# Patient Record
Sex: MALE | Race: OTHER | Employment: FULL TIME | URBAN - METROPOLITAN AREA
[De-identification: names, ages, dates, MRNs, and addresses within clinical notes are randomized per-mention and may not be internally consistent; named-entity substitution may affect disease eponyms.]

---

## 2022-01-19 ENCOUNTER — HOSPITAL ENCOUNTER (OUTPATIENT)
Dept: GENERAL RADIOLOGY | Facility: HOSPITAL | Age: 52
Discharge: HOME | End: 2022-01-19
Attending: HOSPITALIST
Payer: COMMERCIAL

## 2022-01-19 DIAGNOSIS — R06.00 DYSPNEA ON EXERTION: ICD-10-CM

## 2022-01-19 PROCEDURE — 71046 X-RAY EXAM CHEST 2 VIEWS: CPT | Performed by: HOSPITALIST

## 2022-02-02 ENCOUNTER — OFFICE VISIT (OUTPATIENT)
Dept: SLEEP CENTER | Age: 52
End: 2022-02-02
Attending: Other
Payer: COMMERCIAL

## 2022-02-02 DIAGNOSIS — G47.33 OSA (OBSTRUCTIVE SLEEP APNEA): ICD-10-CM

## 2022-02-02 PROCEDURE — 95806 SLEEP STUDY UNATT&RESP EFFT: CPT

## 2022-02-07 NOTE — PROCEDURES
[address]       Accredited by the [facility] ([facility])    PATIENT'S NAME:        Noble Flores PHYSICIAN:   Valerie Reed DO  REFERRING PHYSICIAN:   Sarah Lamar MD  PATIENT ACCOUNT #:     [de-identified]        LOCATION:       [address] [place] [address] #:      [id number]        YOB: 1970  DATE OF STUDY:         02/02/2022    SLEEP STUDY REPORT    STUDY TYPE:  Unattended sleep study. CLINICAL HISTORY:  This is a 53-year-old male whose height is 5 feet 7 inches, weight is 165 pounds, body mass index is 26. Patient is undergoing evaluation for sleep-disordered breathing. He experiences episodes of choking at night. He awakens with his heart racing and is reporting frequent snoring. This is a report of his home sleep test.     UNATTENDED SLEEP STUDY RECORDING PARAMETERS:  The patient underwent a formal technically adequate unattended diagnostic sleep study [facility] The study was performed in accordance with the AASM standard for Out of Center Sleep Testing. The four-channel Type III HST measures the following parameters:  flow, respiratory effort, pulse, and oxygen saturation. SCORING:  This study was scored in accordance with AASM scoring rules and Medicare rule 1B. FINDINGS:  The flow evaluation recording time began at 12:24 a.m. and ended at 8:26 a.m. for a duration of 7 hours and 49 minutes. Snoring was recorded. There were a total of 27 hypopneas and 34 apneas. Overall apnea-hypopnea index was 7.8. Supine index was 10.7, the lateral index was 0.9. The lowest oxyhemoglobin desaturation was seen at a sandrine of 90%. Total time spent with oxyhemoglobin saturations less than 88% was 0 minutes, and average pulse was 63. IMPRESSION:  Overall apnea-hypopnea index is consistent with mild obstructive sleep apnea. The overall AHI is 7.8.   The lowest oxyhemoglobin desaturation is seen at a sandrine of 90%. DIAGNOSIS:  Obstructive sleep apnea. DIAGNOSTIC PLAN:    1. At the request of the referring physician, we will confer with the patient regarding the results of the study and make treatment recommendations. 2.   Patient is a candidate for nasal CPAP, oral appliance therapy, and evaluation of the upper airway by ear, nose, throat specialist.   3.   Weight loss would likely have an ameliorating effect on the degree of sleep-disordered breathing identified, and weight loss is advised as appropriate. 4.   If daytime sleepiness is a complaint, the patient needs to understand potential dangers associated with reduced daytime vigilance. 5.   Care should be used with the administration of anesthetic and sedative agents, and alcohol should be avoided. Thank you for your confidence in the [facility]. Please do not hesitate to contact us for any questions at [phone].     Dictated By Sammy Pratt DO  d: 02/07/2022 13:57:38  t: 02/07/2022 14:56:20  Job [id number]  RB/

## 2022-03-23 ENCOUNTER — ORDER TRANSCRIPTION (OUTPATIENT)
Dept: ADMINISTRATIVE | Facility: HOSPITAL | Age: 52
End: 2022-03-23

## 2022-03-26 ENCOUNTER — LAB ENCOUNTER (OUTPATIENT)
Dept: LAB | Facility: HOSPITAL | Age: 52
End: 2022-03-26
Attending: HOSPITALIST
Payer: COMMERCIAL

## 2022-03-26 DIAGNOSIS — Z01.818 PREOP EXAMINATION: ICD-10-CM

## 2022-03-26 LAB — SARS-COV-2 RNA RESP QL NAA+PROBE: NOT DETECTED

## 2022-03-29 ENCOUNTER — HOSPITAL ENCOUNTER (OUTPATIENT)
Dept: RESPIRATORY THERAPY | Facility: HOSPITAL | Age: 52
Discharge: HOME | End: 2022-03-29
Attending: HOSPITALIST
Payer: COMMERCIAL

## 2022-03-29 DIAGNOSIS — R06.00 DYSPNEA ON EXERTION: ICD-10-CM

## 2022-03-29 PROCEDURE — 94726 PLETHYSMOGRAPHY LUNG VOLUMES: CPT

## 2022-03-29 PROCEDURE — 94729 DIFFUSING CAPACITY: CPT

## 2022-03-29 PROCEDURE — 94010 BREATHING CAPACITY TEST: CPT

## 2022-03-29 PROCEDURE — 94060 EVALUATION OF WHEEZING: CPT

## (undated) DIAGNOSIS — R06.00 DYSPNEA ON EXERTION: Primary | ICD-10-CM

## (undated) DIAGNOSIS — Z01.818 PREOP EXAMINATION: Primary | ICD-10-CM